# Patient Record
Sex: FEMALE | URBAN - METROPOLITAN AREA
[De-identification: names, ages, dates, MRNs, and addresses within clinical notes are randomized per-mention and may not be internally consistent; named-entity substitution may affect disease eponyms.]

---
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
## 2025-05-13 PROCEDURE — 82525 ASSAY OF COPPER: CPT | Performed by: CLINICAL MEDICAL LABORATORY

## 2025-05-13 PROCEDURE — PSEU10042 COPPER, BLOOD: Performed by: CLINICAL MEDICAL LABORATORY

## 2025-05-14 ENCOUNTER — LAB REQUISITION (OUTPATIENT)
Dept: LAB | Age: 20
End: 2025-05-14

## 2025-05-14 DIAGNOSIS — D69.6 THROMBOCYTOPENIA, UNSPECIFIED (CMD): ICD-10-CM

## 2025-05-15 LAB — COPPER SERPL-MCNC: 81 MCG/DL (ref 80–155)
